# Patient Record
Sex: MALE | Race: WHITE
[De-identification: names, ages, dates, MRNs, and addresses within clinical notes are randomized per-mention and may not be internally consistent; named-entity substitution may affect disease eponyms.]

---

## 2019-04-25 ENCOUNTER — APPOINTMENT (OUTPATIENT)
Dept: ORTHOPEDIC SURGERY | Facility: CLINIC | Age: 24
End: 2019-04-25
Payer: COMMERCIAL

## 2019-04-25 DIAGNOSIS — S93.499A SPRAIN OF OTHER LIGAMENT OF UNSPECIFIED ANKLE, INITIAL ENCOUNTER: ICD-10-CM

## 2019-04-25 PROBLEM — Z00.00 ENCOUNTER FOR PREVENTIVE HEALTH EXAMINATION: Status: ACTIVE | Noted: 2019-04-25

## 2019-04-25 PROCEDURE — 73610 X-RAY EXAM OF ANKLE: CPT

## 2019-04-25 PROCEDURE — 99213 OFFICE O/P EST LOW 20 MIN: CPT

## 2019-04-25 PROCEDURE — 73630 X-RAY EXAM OF FOOT: CPT

## 2019-04-25 NOTE — HISTORY OF PRESENT ILLNESS
[de-identified] : PATIENT REPORTS SPRAINING FOOT/ANKLE 04/01/19. PATIENT REPORTS FEELING BETTER BUT STILL FEELING A DULL ACHE IN FOOT. PAIN WITH ANY WEIGHT BEARING OR PRESSURE. FOOT>ANKLE.

## 2019-04-25 NOTE — PHYSICAL EXAM
[de-identified] : Right ankle examination:\par \par Constitutional: \par The patient is healthy-appearing and in no apparent distress. \par \par Gait and Station: \par The patient ambulates with a normal / [antalgic] gait, no limp / [limp]. \par \par Cardiovascular System: \par There is capillary refill less than 2 seconds. \par \par Skin: \par There is no skin abnormalities of ankle.\par \par Ankles and Feet: \par Inspection: \par There is no erythema, induration, warmth, or deformity.  There is [swelling (anterolateral)]. \par \par Bony Palpation: \par There is no tenderness of the calcaneal tuberosity, the metatarsals, the tarsometatarsal joints, the navicular tuberosity, the dome of talus, the head of talus, the inferior tibiofibular joint, or the achilles tendon insertion.  There is [tenderness of the gutter ankle (anterolateral)]. \par \par Soft Tissue Palpation: no tenderness of the tibialis posterior, the tibialis anterior, the plantar fascia, the achilles tendon, the peroneus longus and brevis, the extensor hallucis longus, the sinus tarsi, the lateral anterior talofibular ligament, the posterior talofibular ligament, the peroneal retinaculum, or the deltoid ligament.   There is [tenderness of the anterior talofibular ligament] and the [calcaneofibular ligament]. \par \par Active Range of Motion: \par There is normal great toe flexion normal and extension normal and dorsiflexion normal, plantar flexion normal, inversion normal, and eversion normal. \par \par Stability: \par There anterior drawer is negative. \par \par Strength: \par There is extensor digitorum longus (5/5) and brevis (5/5); extensor hallucis longus (5/5); peroneus longus (5/5) and brevis (5/5); and posterior tibialis (5/5), tibialis anterior (5/5), and gastrocnemius (5/5). \par \par Neurological System: \par There is normal sensation to light touch at the ankle and foot. \par \par Psychiatric: \par The patient demonstrates a normal mood and affect and is active and alert.\par

## 2019-04-25 NOTE — ASSESSMENT
[FreeTextEntry1] : Discussed at length with patient exam consistent with ankle sprain and overall clinical improvement. Home exercises given. Patient offered physical therapy but declines. He is to follow up in 5-6 weeks if no significant improvement